# Patient Record
Sex: FEMALE | Race: WHITE | Employment: UNEMPLOYED | URBAN - METROPOLITAN AREA
[De-identification: names, ages, dates, MRNs, and addresses within clinical notes are randomized per-mention and may not be internally consistent; named-entity substitution may affect disease eponyms.]

---

## 2017-04-04 ENCOUNTER — EMPLOYEE WELLNESS (OUTPATIENT)
Dept: OTHER | Age: 27
End: 2017-04-04

## 2017-04-04 LAB
CHOLESTEROL, TOTAL: 140 MG/DL (ref 0–199)
GLUCOSE BLD-MCNC: 72 MG/DL (ref 70–99)
HDLC SERPL-MCNC: 53 MG/DL (ref 40–60)
LDL CHOLESTEROL CALCULATED: 72 MG/DL
TRIGL SERPL-MCNC: 73 MG/DL (ref 0–150)

## 2018-03-20 VITALS — BODY MASS INDEX: 37.38 KG/M2 | WEIGHT: 211 LBS

## 2019-03-19 ENCOUNTER — OFFICE VISIT (OUTPATIENT)
Dept: PRIMARY CARE CLINIC | Age: 29
End: 2019-03-19
Payer: COMMERCIAL

## 2019-03-19 VITALS
WEIGHT: 206 LBS | HEART RATE: 106 BPM | DIASTOLIC BLOOD PRESSURE: 86 MMHG | SYSTOLIC BLOOD PRESSURE: 110 MMHG | BODY MASS INDEX: 37.91 KG/M2 | OXYGEN SATURATION: 98 % | HEIGHT: 62 IN | TEMPERATURE: 99 F

## 2019-03-19 DIAGNOSIS — J01.00 ACUTE NON-RECURRENT MAXILLARY SINUSITIS: Primary | ICD-10-CM

## 2019-03-19 DIAGNOSIS — R68.89 FLU-LIKE SYMPTOMS: ICD-10-CM

## 2019-03-19 DIAGNOSIS — H46.11 OPTIC NEURITIS, RETROBULBAR (ACUTE), RIGHT: ICD-10-CM

## 2019-03-19 DIAGNOSIS — G93.9 CHRONIC NON-SPECIFIC WHITE MATTER LESIONS ON MRI: ICD-10-CM

## 2019-03-19 LAB
INFLUENZA A ANTIBODY: NORMAL
INFLUENZA B ANTIBODY: NORMAL

## 2019-03-19 PROCEDURE — 87804 INFLUENZA ASSAY W/OPTIC: CPT | Performed by: NURSE PRACTITIONER

## 2019-03-19 PROCEDURE — 99213 OFFICE O/P EST LOW 20 MIN: CPT | Performed by: NURSE PRACTITIONER

## 2019-03-19 RX ORDER — CETIRIZINE HYDROCHLORIDE 10 MG/1
10 TABLET ORAL DAILY
COMMUNITY

## 2019-03-19 RX ORDER — AMOXICILLIN AND CLAVULANATE POTASSIUM 875; 125 MG/1; MG/1
1 TABLET, FILM COATED ORAL 2 TIMES DAILY
Qty: 14 TABLET | Refills: 0 | Status: SHIPPED | OUTPATIENT
Start: 2019-03-19 | End: 2019-03-26

## 2019-03-19 ASSESSMENT — PATIENT HEALTH QUESTIONNAIRE - PHQ9
1. LITTLE INTEREST OR PLEASURE IN DOING THINGS: 0
SUM OF ALL RESPONSES TO PHQ9 QUESTIONS 1 & 2: 0
2. FEELING DOWN, DEPRESSED OR HOPELESS: 0
SUM OF ALL RESPONSES TO PHQ QUESTIONS 1-9: 0
SUM OF ALL RESPONSES TO PHQ QUESTIONS 1-9: 0

## 2019-03-19 ASSESSMENT — ENCOUNTER SYMPTOMS
SINUS PRESSURE: 1
COUGH: 0
RHINORRHEA: 1
SORE THROAT: 1

## 2019-06-04 ENCOUNTER — OFFICE VISIT (OUTPATIENT)
Dept: PRIMARY CARE CLINIC | Age: 29
End: 2019-06-04
Payer: COMMERCIAL

## 2019-06-04 VITALS
HEART RATE: 97 BPM | SYSTOLIC BLOOD PRESSURE: 126 MMHG | HEIGHT: 62 IN | BODY MASS INDEX: 38.09 KG/M2 | WEIGHT: 207 LBS | DIASTOLIC BLOOD PRESSURE: 81 MMHG | TEMPERATURE: 98.8 F

## 2019-06-04 DIAGNOSIS — J01.00 ACUTE MAXILLARY SINUSITIS, RECURRENCE NOT SPECIFIED: Primary | ICD-10-CM

## 2019-06-04 PROCEDURE — 99213 OFFICE O/P EST LOW 20 MIN: CPT | Performed by: NURSE PRACTITIONER

## 2019-06-04 RX ORDER — AMOXICILLIN AND CLAVULANATE POTASSIUM 875; 125 MG/1; MG/1
1 TABLET, FILM COATED ORAL 2 TIMES DAILY
Qty: 14 TABLET | Refills: 0 | Status: SHIPPED | OUTPATIENT
Start: 2019-06-04 | End: 2019-06-11

## 2019-06-04 ASSESSMENT — ENCOUNTER SYMPTOMS
SINUS PRESSURE: 1
SORE THROAT: 1

## 2019-06-04 NOTE — PROGRESS NOTES
Subjective:      Patient ID: Adilia Galvan is a 33 y.o. female. HPI 32 yo female presents to clinic for 1 day history of sore throat, bilateral ear pain, teeth pain, congestion/rhinorrhea. Fatigued. Did not check temperature but feels chills, sweats. Going on vacation tomorrow. Patient past medical history, family history, social, and smoking reviewed and updated as pertinent. Health maintenance has been reviewed. Prior to Visit Medications    Medication Sig Taking? Authorizing Provider   cetirizine (ZYRTEC) 10 MG tablet Take 10 mg by mouth daily Yes Historical Provider, MD   Fexofenadine HCl (ALLEGRA PO) Take by mouth  Historical Provider, MD         Review of Systems   Constitutional: Positive for fatigue. HENT: Positive for congestion, ear pain, sinus pressure and sore throat. All other systems reviewed and are negative. Objective:   Physical Exam   Constitutional: She appears well-developed and well-nourished. HENT:   Right Ear: External ear normal.   Left Ear: External ear normal.   Nose: Right sinus exhibits maxillary sinus tenderness and frontal sinus tenderness. Left sinus exhibits maxillary sinus tenderness and frontal sinus tenderness. Mouth/Throat: Oropharynx is clear and moist.   Cardiovascular: Normal rate, regular rhythm and normal heart sounds. Pulmonary/Chest: Effort normal and breath sounds normal.   Lymphadenopathy:     She has no cervical adenopathy. Assessment:       Diagnosis Orders   1.  Acute maxillary sinusitis, recurrence not specified             Plan:      Treat augmentin  rx due to vacation  I advised patient symptoms may be viral and if not improved, this is likely why  rtc prn        CHRISTIAN Ivan - CNP

## 2019-06-19 PROBLEM — G35 MS (MULTIPLE SCLEROSIS) (HCC): Status: ACTIVE | Noted: 2019-06-19

## 2019-08-09 LAB
ABSOLUTE BANDS #: NORMAL
ABSOLUTE EOS #: 0.3
ABSOLUTE IMMATURE GRANULOCYTE: 0
ABSOLUTE LYMPH #: 0.7
ABSOLUTE MONO #: 0.7
ALBUMIN SERPL-MCNC: 4.6 G/DL
ALP BLD-CCNC: 90 U/L
ALT SERPL-CCNC: 33 U/L
ANION GAP SERPL CALCULATED.3IONS-SCNC: NORMAL MMOL/L
AST SERPL-CCNC: 42 U/L
BANDS: NORMAL
BASOPHILS # BLD: 1 10*3/UL
BASOPHILS ABSOLUTE: 0 /ΜL
BILIRUB SERPL-MCNC: 0.4 MG/DL (ref 0.1–1.4)
BUN BLDV-MCNC: 8 MG/DL
CALCIUM SERPL-MCNC: 9.6 MG/DL
CHLORIDE BLD-SCNC: 103 MMOL/L
CO2: 23 MMOL/L
CREAT SERPL-MCNC: 0.57 MG/DL
EOSINOPHILS RELATIVE PERCENT: 6
GFR CALCULATED: 126
GLUCOSE BLD-MCNC: 80 MG/DL
HCT VFR BLD CALC: 42.1 % (ref 36–46)
HEMOGLOBIN: 13.9 G/DL (ref 12–16)
IMMATURE GRANULOCYTES: 0
LYMPHOCYTES # BLD: 14 10*3/UL
MCH RBC QN AUTO: 26.7 PG
MCHC RBC AUTO-ENTMCNC: 33 G/DL
MCV RBC AUTO: 81 FL
MONOCYTES # BLD: 14 10*3/UL
MORPHOLOGY: NORMAL
NRBC AUTOMATED: NORMAL
PDW BLD-RTO: 13.3 %
PLATELET # BLD: 325 K/ΜL
PLATELET ESTIMATE: NORMAL
PMV BLD AUTO: NORMAL FL
POTASSIUM SERPL-SCNC: 4.5 MMOL/L
RBC # BLD: 5.21 10^6/ΜL
RBC # BLD: NORMAL 10*6/UL
SEG NEUTROPHILS: 65
SEGMENTED NEUTROPHILS ABSOLUTE COUNT: 2.9
SODIUM BLD-SCNC: 141 MMOL/L
TOTAL PROTEIN: 7.8
VITAMIN D 25-HYDROXY: 36.7
VITAMIN D2, 25 HYDROXY: NORMAL
VITAMIN D3,25 HYDROXY: NORMAL
WBC # BLD: 4.5 10^3/ML
WBC # BLD: NORMAL 10*3/UL

## 2019-08-21 ENCOUNTER — OFFICE VISIT (OUTPATIENT)
Dept: PRIMARY CARE CLINIC | Age: 29
End: 2019-08-21
Payer: COMMERCIAL

## 2019-08-21 VITALS
SYSTOLIC BLOOD PRESSURE: 126 MMHG | DIASTOLIC BLOOD PRESSURE: 98 MMHG | HEART RATE: 84 BPM | BODY MASS INDEX: 38.83 KG/M2 | HEIGHT: 62 IN | OXYGEN SATURATION: 98 % | WEIGHT: 211 LBS

## 2019-08-21 DIAGNOSIS — F43.29 STRESS AND ADJUSTMENT REACTION: ICD-10-CM

## 2019-08-21 DIAGNOSIS — E28.2 PCOS (POLYCYSTIC OVARIAN SYNDROME): ICD-10-CM

## 2019-08-21 DIAGNOSIS — R03.0 ELEVATED BLOOD PRESSURE, SITUATIONAL: Primary | ICD-10-CM

## 2019-08-21 DIAGNOSIS — G35 MS (MULTIPLE SCLEROSIS) (HCC): ICD-10-CM

## 2019-08-21 PROCEDURE — 99214 OFFICE O/P EST MOD 30 MIN: CPT | Performed by: FAMILY MEDICINE

## 2019-08-21 RX ORDER — ERGOCALCIFEROL 1.25 MG/1
50000 CAPSULE ORAL WEEKLY
COMMUNITY
Start: 2019-03-21

## 2019-08-21 RX ORDER — BLOOD PRESSURE TEST KIT
KIT MISCELLANEOUS
Qty: 1 KIT | Refills: 0 | Status: SHIPPED | OUTPATIENT
Start: 2019-08-21

## 2019-08-21 RX ORDER — FINGOLIMOD HCL 0.5 MG/1
0.5 CAPSULE ORAL DAILY
COMMUNITY
Start: 2019-07-23

## 2019-08-21 NOTE — PROGRESS NOTES
off in my office for review. I have also counseled patient on healthy diet, increasing exercise and avoiding excessive caffeine.  - Blood Pressure KIT; Check BP at home TID - please get appropriate fitting cuff  Dispense: 1 kit; Refill: 0    2. Stress and adjustment reaction  Reports since he initiated work-up and got diagnosis of MS in February of this year she has had some increase in stress and anxiety. It is not severe, but somewhat noticeable. We will continue to monitor with more time. 3. MS (multiple sclerosis) ([facility] [address]  Formal diagnosis in March of this year. Patient on maintenance medication. Continue to follow per neurology recommendations. 4. PCOS (polycystic ovarian syndrome)  Chronically reported. Given patient education today and counseled on exercise/weight loss as mainstay of treatment. Given this and need for general female wellness we will refer to gynecology now. [facility]ne[facility]      Return if symptoms worsen or fail to improve.                Cleven Mail, DO

## 2019-08-21 NOTE — PATIENT INSTRUCTIONS
Patient Education        Polycystic Ovary Syndrome: Care Instructions  Your Care Instructions  Polycystic ovary syndrome, or PCOS, means a woman's hormones are out of balance. It can cause problems with your periods and make it hard to get pregnant. Doctors don't know for sure what causes PCOS, but it seems to run in families. It also seems to be linked to obesity and a risk for diabetes. If you have PCOS, your sisters and daughters have a higher chance of getting it too. You may have other symptoms. These include weight gain, acne, too much hair growth on the face or body, high blood pressure, and high blood sugar. Your ovaries may have cysts on them. These cysts are growths filled with fluid. Keep in mind that although you may not have regular periods, you can still get pregnant. Talk to your doctor about birth control if you do not want to get pregnant. Sometimes the hormone changes with PCOS can also make it hard for some women to get pregnant. If this is a concern, talk to your doctor about treatment for this problem. Women who have PCOS can go for months or longer with no period. Your doctor may recommend medicines that can help get your cycles back to normal.  Follow-up care is a key part of your treatment and safety. Be sure to make and go to all appointments, and call your doctor if you are having problems. It's also a good idea to know your test results and keep a list of the medicines you take. How can you care for yourself at home? · Take your medicines exactly as prescribed. Call your doctor if you think you are having a problem with your medicine. · Eat a healthy diet. Include fruits, vegetables, beans, and whole grains in your diet each day. · If you are overweight, losing weight can help with many of the symptoms of PCOS. Talk to your doctor about safe ways to lose weight. · Get at least 30 minutes of exercise on most days of the week. Walking is a good choice.  Or you can run, swim, cycle,

## 2019-08-22 ASSESSMENT — ENCOUNTER SYMPTOMS
WHEEZING: 0
SHORTNESS OF BREATH: 0
VOMITING: 0
ABDOMINAL PAIN: 0
EYE PAIN: 0
NAUSEA: 0
COUGH: 0
EYE ITCHING: 0
DIARRHEA: 0
SORE THROAT: 0
CONSTIPATION: 0

## 2019-12-11 LAB
ABSOLUTE BANDS #: NORMAL
ABSOLUTE EOS #: 0.3
ABSOLUTE IMMATURE GRANULOCYTE: 0
ABSOLUTE LYMPH #: 0.5
ABSOLUTE MONO #: 0.4
ALBUMIN SERPL-MCNC: 4.6 G/DL
ALP BLD-CCNC: 90 U/L
ALT SERPL-CCNC: 40 U/L
ANION GAP SERPL CALCULATED.3IONS-SCNC: NORMAL MMOL/L
AST SERPL-CCNC: 44 U/L
BANDS: NORMAL
BASOPHILS # BLD: 1 10*3/UL
BASOPHILS ABSOLUTE: 0 /ΜL
BILIRUB SERPL-MCNC: 0.5 MG/DL (ref 0.1–1.4)
BUN BLDV-MCNC: 9 MG/DL
CALCIUM SERPL-MCNC: 9.3 MG/DL
CHLORIDE BLD-SCNC: 104 MMOL/L
CO2: 22 MMOL/L
CREAT SERPL-MCNC: 0.56 MG/DL
EOSINOPHILS RELATIVE PERCENT: 8
GFR CALCULATED: 126
GLUCOSE BLD-MCNC: 88 MG/DL
HCT VFR BLD CALC: 41.8 % (ref 36–46)
HEMOGLOBIN: 14.1 G/DL (ref 12–16)
IMMATURE GRANULOCYTES: 0
LYMPHOCYTES # BLD: 13 10*3/UL
MCH RBC QN AUTO: 28.3 PG
MCHC RBC AUTO-ENTMCNC: 33.7 G/DL
MCV RBC AUTO: 84 FL
MONOCYTES # BLD: 9 10*3/UL
MORPHOLOGY: NORMAL
NRBC AUTOMATED: NORMAL
PDW BLD-RTO: 13.1 %
PLATELET # BLD: 283 K/ΜL
PLATELET ESTIMATE: NORMAL
PMV BLD AUTO: NORMAL FL
POTASSIUM SERPL-SCNC: 4.4 MMOL/L
RBC # BLD: 4.98 10^6/ΜL
RBC # BLD: NORMAL 10*6/UL
SEG NEUTROPHILS: 69
SEGMENTED NEUTROPHILS ABSOLUTE COUNT: 3
SODIUM BLD-SCNC: 141 MMOL/L
TOTAL PROTEIN: 6.9
VITAMIN D 25-HYDROXY: 60.4
VITAMIN D2, 25 HYDROXY: NORMAL
VITAMIN D3,25 HYDROXY: NORMAL
WBC # BLD: 4.2 10^3/ML
WBC # BLD: NORMAL 10*3/UL

## 2019-12-19 ENCOUNTER — TELEPHONE (OUTPATIENT)
Dept: PRIMARY CARE CLINIC | Age: 29
End: 2019-12-19

## 2020-02-27 ENCOUNTER — HOSPITAL ENCOUNTER (OUTPATIENT)
Dept: GENERAL RADIOLOGY | Age: 30
Discharge: HOME | End: 2020-02-27
Payer: COMMERCIAL

## 2020-02-27 PROCEDURE — 77080 DXA BONE DENSITY AXIAL: CPT

## 2020-07-21 ENCOUNTER — OFFICE VISIT (OUTPATIENT)
Dept: PRIMARY CARE CLINIC | Age: 30
End: 2020-07-21
Payer: COMMERCIAL

## 2020-07-21 PROCEDURE — 99211 OFF/OP EST MAY X REQ PHY/QHP: CPT | Performed by: NURSE PRACTITIONER

## 2020-07-21 NOTE — PROGRESS NOTES
Lisbeth Lindsay received a viral test for COVID-19. They were educated on isolation and quarantine as appropriate. For any symptoms, they were directed to seek care from their PCP, given contact information to establish with a doctor, directed to an urgent care or the emergency room.

## 2020-07-28 LAB
SARS-COV-2: NOT DETECTED
SOURCE: NORMAL